# Patient Record
(demographics unavailable — no encounter records)

---

## 2024-12-26 NOTE — CARDIOLOGY SUMMARY
[de-identified] : 12/20/22: LELAND:  Summary:  1. Left ventricular ejection fraction, by visual estimation, is 50 to 55%.  2. Low normal global left ventricular systolic function.  3. No left atrial appendage thrombus and normal left atrial appendage velocities.  4. Color flow doppler and intravenous injection of agitated saline demonstrates the presence of an intact intra atrial septum.  5. Intact intra-atrial septum without shunt.  6. Normal right ventricular size and function.  7. The left atrium is normal in size.  8. Trace mitral valve regurgitation.  9. Normal trileaflet aortic valve with normal opening. 10. There is no evidence of pericardial effusion. 11. No evidence of intracardiac thrombus, masses or valvular vegetations. Arabella Leung MD Electronically signed on 12/20/2022 at 3:38:15 PM  [de-identified] :    < from: Cardiac Cath Lab - Adult (12.17.19 @ 16:26) > DIAGNOSTIC IMPRESSIONS: There is significant double vessel coronary artery disease. Prox OM2=70% (Jailed Vessel) Distal RCA=75% Successful iFR and IVUS of the Distal RCA Successful PCI of the distal RCA with RENITA x 1 (Jasen 3.5x15mm)

## 2024-12-26 NOTE — DISCUSSION/SUMMARY
[EKG obtained to assist in diagnosis and management of assessed problem(s)] : EKG obtained to assist in diagnosis and management of assessed problem(s) [FreeTextEntry1] : Mr. Reyes is a 59 year old male with PMH of HTN, hyperlipidemia, PVD, CAD s/p PCI, GIB, obstructive sleep apnea (not compliant w/CPAP), LBBB, and symptomatic pAF s/p ILR  ( 11/9/20) underwent uncomplicated radiofrequency ablation of atrial fibrillation (WACA/PVI, CTI) on 12/20/22 by Dr. Noel, off [facility] 7/2023 presents in for arrhythmia management and interrogation of the ILR.   HV interval 54 ms. [id number] 2  - Doing very well and maintaining SR since ablation with continued symptomatic improvement. - continue Plavix. Continue Metoprolol 50 mg QD  - Renewed cardiac meds since patient plans to transfer care to [facility]  cardiologist )  - Advised to follow up with Pulm for AAYUSH  - EP f/up in 1 y , sooner PRN

## 2024-12-26 NOTE — CARDIOLOGY SUMMARY
[de-identified] : 12/20/22: LELAND:  Summary:  1. Left ventricular ejection fraction, by visual estimation, is 50 to 55%.  2. Low normal global left ventricular systolic function.  3. No left atrial appendage thrombus and normal left atrial appendage velocities.  4. Color flow doppler and intravenous injection of agitated saline demonstrates the presence of an intact intra atrial septum.  5. Intact intra-atrial septum without shunt.  6. Normal right ventricular size and function.  7. The left atrium is normal in size.  8. Trace mitral valve regurgitation.  9. Normal trileaflet aortic valve with normal opening. 10. There is no evidence of pericardial effusion. 11. No evidence of intracardiac thrombus, masses or valvular vegetations. Arabella Leung MD Electronically signed on 12/20/2022 at 3:38:15 PM  [de-identified] :    < from: Cardiac Cath Lab - Adult (12.17.19 @ 16:26) > DIAGNOSTIC IMPRESSIONS: There is significant double vessel coronary artery disease. Prox OM2=70% (Jailed Vessel) Distal RCA=75% Successful iFR and IVUS of the Distal RCA Successful PCI of the distal RCA with RENITA x 1 (Jasen 3.5x15mm)

## 2024-12-26 NOTE — HISTORY OF PRESENT ILLNESS
[FreeTextEntry1] : Mr. Reyes is a 59 year old male with PMH of HTN, hyperlipidemia, PVD, CAD s/p PCI, GIB, obstructive sleep apnea (not compliant w/CPAP), LBBB, and symptomatic pAF s/p ILR  ( 11/9/20) underwent uncomplicated radiofrequency ablation of atrial fibrillation (WACA/PVI, CTI) on 12/20/22 by Dr. Noel. HV interval 54 ms. [id number] 2 . Not on AC sincs 7/2023.   To summarize, he has had recurrent paroxysmal AF with rapid ventricular rates with high burden. ILR was implanted 11/9/2020 and has noted AF burden up to 60% with prolonged episodes of both AF and atrial flutter. He has been on Toprol 50mg qd.  He has noted progressive exertional dyspnea.  In 7/2023, Since he reported feeling well and remained symptom- free post ablation, he was advised to discontinue Xarelto since his CHADS VASc - 2. And it was recommended to continue to monitor for AF on ILR. If recurrent AF noted, would recommend restarting a/c LELAND revealed LVEF 50%.   ILR shows stable heart rate trends with no pause or mily alerts, and no AT/AF episodes since the last follow-up. 12 lead ECG- NSR 60s with normal axis and intervals, normal QRSd, no ST-T change, LVH.    .

## 2024-12-26 NOTE — HISTORY OF PRESENT ILLNESS
[FreeTextEntry1] : Mr. Reyes is a 59 year old male with PMH of HTN, hyperlipidemia, PVD, CAD s/p PCI, GIB, obstructive sleep apnea (not compliant w/CPAP), LBBB, and symptomatic pAF s/p ILR  ( 11/9/20) underwent uncomplicated radiofrequency ablation of atrial fibrillation (WACA/PVI, CTI) on 12/20/22 by Dr. Noel. HV interval 54 ms. [id number] 2 . Not on AC sincs 7/2023.   To summarize, he has had recurrent paroxysmal AF with rapid ventricular rates with high burden. ILR was implanted 11/9/2020 and has noted AF burden up to 60% with prolonged episodes of both AF and atrial flutter. He has been on Toprol 50mg qd.  He has noted progressive exertional dyspnea.  In 7/2023, Since he reported feeling well and remained symptom- free post ablation, he was advised to discontinue Xarelto since his CHADS VASc - 2. And it was recommended to continue to monitor for AF on ILR. If recurrent AF noted, would recommend restarting a/c LELAND revealed LVEF 50%.   ILR shows stable heart rate trends with no pause or mily alerts, and no AT/AF episodes since the last follow-up. 12 lead ECG- NSR 60s with normal axis and intervals, normal QRSd, no ST-T change, LVH.    .

## 2024-12-26 NOTE — HISTORY OF PRESENT ILLNESS
[FreeTextEntry1] : Mr. Reyes is a 59 year old male with PMH of HTN, hyperlipidemia, PVD, CAD s/p PCI, GIB, obstructive sleep apnea (not compliant w/CPAP), LBBB, and symptomatic pAF s/p ILR  ( 11/9/20) underwent uncomplicated radiofrequency ablation of atrial fibrillation (WACA/PVI, CTI) on 12/20/22 by Dr. Noel. HV interval 54 ms. [id number] 2 . Not on AC sincs 7/2023.   To summarize, he has had recurrent paroxysmal AF with rapid ventricular rates with high burden. ILR was implanted 11/9/2020 and has noted AF burden up to 60% with prolonged episodes of both AF and atrial flutter. He has been on Toprol 50mg qd.  He has noted progressive exertional dyspnea.  In 7/2023, Since he reported feeling well and remained symptom- free post ablation, he was advised to discontinue Xarelto since his CHADS VASc - 2. And it was recommended to continue to monitor for AF on ILR. If recurrent AF noted, would recommend restarting a/c LELAND revealed LVEF 50%.   ILR shows stable heart rate trends with no pause or mily alerts, and no AT/AF episodes since the last follow-up. 12 lead ECG- NSR 60s with normal axis and intervals, normal QRSd, no ST-T change, LVH.    .

## 2024-12-26 NOTE — ADDENDUM
[FreeTextEntry1] :   Pt feeling very well s/p ablation, with no recurrent arrhythmia noted on ILR. Given intermediate thromboembolic risk, lack of AF recurrence, and increased bleeding risks given need for concomitant antiplatelet therapy, will continue off anticoagulation for now. He will continue clopidogrel. If recurrent AF noted on ILR, will consider restarting OAC.

## 2024-12-26 NOTE — DISCUSSION/SUMMARY
[EKG obtained to assist in diagnosis and management of assessed problem(s)] : EKG obtained to assist in diagnosis and management of assessed problem(s) [FreeTextEntry1] : Mr. Reyes is a 59 year old male with PMH of HTN, hyperlipidemia, PVD, CAD s/p PCI, GIB, obstructive sleep apnea (not compliant w/CPAP), LBBB, and symptomatic pAF s/p ILR  ( 11/9/20) underwent uncomplicated radiofrequency ablation of atrial fibrillation (WACA/PVI, CTI) on 12/20/22 by Dr. Noel, off [facility] 7/2023 presents in for arrhythmia management and interrogation of the ILR.   HV interval 54 ms. [id number] 2  - Doing very well and maintaining SR since ablation with continued symptomatic improvement. - continue Plavix. Continue Metoprolol 50 mg QD  - Renewed cardiac meds since patient plans to transfer care to [facility]  cardiologist )  - Advised to follow up with Pulm for AAYUSH  - EP f/up in 1 y , sooner PRN

## 2024-12-26 NOTE — CARDIOLOGY SUMMARY
[de-identified] : 12/20/22: LELAND:  Summary:  1. Left ventricular ejection fraction, by visual estimation, is 50 to 55%.  2. Low normal global left ventricular systolic function.  3. No left atrial appendage thrombus and normal left atrial appendage velocities.  4. Color flow doppler and intravenous injection of agitated saline demonstrates the presence of an intact intra atrial septum.  5. Intact intra-atrial septum without shunt.  6. Normal right ventricular size and function.  7. The left atrium is normal in size.  8. Trace mitral valve regurgitation.  9. Normal trileaflet aortic valve with normal opening. 10. There is no evidence of pericardial effusion. 11. No evidence of intracardiac thrombus, masses or valvular vegetations. Arabella Leung MD Electronically signed on 12/20/2022 at 3:38:15 PM  [de-identified] :    < from: Cardiac Cath Lab - Adult (12.17.19 @ 16:26) > DIAGNOSTIC IMPRESSIONS: There is significant double vessel coronary artery disease. Prox OM2=70% (Jailed Vessel) Distal RCA=75% Successful iFR and IVUS of the Distal RCA Successful PCI of the distal RCA with RENITA x 1 (Jasen 3.5x15mm)

## 2024-12-26 NOTE — DISCUSSION/SUMMARY
[EKG obtained to assist in diagnosis and management of assessed problem(s)] : EKG obtained to assist in diagnosis and management of assessed problem(s) [FreeTextEntry1] : Mr. Reyes is a 59 year old male with PMH of HTN, hyperlipidemia, PVD, CAD s/p PCI, GIB, obstructive sleep apnea (not compliant w/CPAP), LBBB, and symptomatic pAF s/p ILR  ( 11/9/20) underwent uncomplicated radiofrequency ablation of atrial fibrillation (WACA/PVI, CTI) on 12/20/22 by Dr. Noel, off [facility] 7/2023 presents in for arrhythmia management and interrogation of the ILR.   HV interval 54 ms. [id number] 2  - Doing very well and maintaining SR since ablation with continued symptomatic improvement. - continue Plavix. Continue Metoprolol 50 mg QD  - Renewed cardiac meds since patient plans to transfer care to [facility]  cardiologist )  - Advised to follow up with Pulm for AAYUSH  - EP f/up in 1 y , sooner PRN

## 2025-01-24 NOTE — HISTORY OF PRESENT ILLNESS
[FreeTextEntry1] : 60 y/o M with PMH HTN, HLD, CAD/prior MI s/p prior PCI ([facility] 2022 with patent stents), AF s/p prior ablation 12/2022 with Dr. Noel, s/p ILR, off Xarelto with plans to resume if recurrent AF on ILR, h/o LBBB, PVD, AAYUSH (noncompliant with CPAP) presents to establish care. He follows with Dr. Noel for EP; was seen 12/2024 with no changes in management. He is currently in his usual state of health. Denies CP, dyspnea, palpitations, dizziness, lightheadedness, LE edema, syncope or near syncope. Compliant with medications; takes all BP meds at 8:30-9AM and has not taken any yet today. He is unable to exercise due to sciatica and ankle pain.  Family Hx: mother- MI at 50  Social Hx: fprmer smoker, quit 35 years ago; occ ETOH; denies illicit drug use

## 2025-01-24 NOTE — DISCUSSION/SUMMARY
[FreeTextEntry1] : 60 y/o M with PMH HTN, HLD, CAD/prior MI s/p prior PCI ([facility] 2022 with patent stents), AF s/p prior ablation 12/2022 with Dr. Noel, s/p ILR, off Xarelto with plans to resume if recurrent AF on ILR, h/o LBBB, PVD, AAYUSH (noncompliant with CPAP) presents to establish care.   #CAD s/p PCI  Patent stents on [facility] 2022 Stable, asymptomatic  Continue plavix, statin  #AF s/p prior ablation #S/p ILR  Stable, asymptomatic  ILR check 12/2024 with no recurrent AF  Previously on AC, now off; would resume if recurrent AF on ILR monitoring   #HTN  Uncontrolled Due for BP medication  Advised to switch one of his medications to nighttime and monitor ambulatory BP  Has BP check scheduled with PCP in 1 month   #HLD  Request records of lipid panel from PCP  Continue lipitor   #Obesity BMI 30 Diet, lifestyle modifications discussed Advised to increase physical activity as tolerated [EKG obtained to assist in diagnosis and management of assessed problem(s)] : EKG obtained to assist in diagnosis and management of assessed problem(s)

## 2025-01-24 NOTE — CARDIOLOGY SUMMARY
[de-identified] : LELAND 12/2022:  LVEF 50-55%, normal RV size/function, trace MR [de-identified] : SR, LBBB [de-identified] : 7/8/22: LM no disease pLAD 20-30%, Diag 60-70% (similar to previous 2019) mLCx 40-50% ISR, OM1 50% jailed with LCx stent mRCA 10%, dRCA 10%, RPDA 10%

## 2025-01-31 NOTE — HISTORY OF PRESENT ILLNESS
[Work related] : work related [Full time] : Work status: full time [] : yes [de-identified] : WC DOI - 3/3/2017  WC DOI 3/3/17   7/20/17: R ankle scope and brostrum (Dr Joesph)  2/9/18: R ankle twisting injury after he fell off curb at work. PT, cortisone injections. Has been OOW.  3/16/18: f/u R ankle, continued lateral pain that has been worsening. HEP, Elevating, OOW  5/3/18: f/u R ankle, HEP using brace, here to review MRI. Continued pain. OOW  6/7/18: f/u R ankle, continued lateral pain unchanged. HEP. OOW. awaiting auth for surgery.  8/2/18: f/u R ankle, HEP, pain unchanged, awaiting auth for surgery. oow 8/30/18 f/u R anle HEP. PT and surgery auth pending 10/4/18 f/u R ankle HEP Surgery auth pending  12/17/18: R peroneal tenodesis/repair  12/24/2018: nwb. no issues 1/2/19: f/u R ankle. Cam boot. Mild pain. 2/6/19 f/u R ankle HEP, did not start PT 4/3/19 f/u R ankle HEP/PT RTW 4/4/19 6/6/19: f/u R ankle, continued lateral pain. hep. returned to work. 10/10/19 f/u R ankle HEP working  1/31/2025 - pt is here for right ankle increase in pain/swelling to the lateral ankle since 10/2024. wbat in sneakers. Ibuprofen helps at times. no further tx/injury since 2019. currently full time/duty.  [FreeTextEntry3] : 3/3/2017 [de-identified] :

## 2025-01-31 NOTE — DISCUSSION/SUMMARY
[de-identified] : The nature of the injury was discussed. Treatment options reviewed including injections which he will defer. Ibuprofen 800mg TID as needed sent to the pharmacy PT indicated f/u 6 weeks   The patient was explained the options as well as benefits of over the counter verses prescription strength nonsteroidal anti-inflammatory medication. The patient opts for a prescription strength medication.

## 2025-06-09 NOTE — PLAN
[FreeTextEntry1] : Status post left inguinal hernia repair May 23, 2025.  Incision clean dry and intact.

## 2025-07-22 NOTE — PLAN
[FreeTextEntry1] : Status post left inguinal hernia repair May 23, 2025.  Repair intact.  Doing well clinically.